# Patient Record
Sex: MALE | Race: WHITE | Employment: FULL TIME | URBAN - METROPOLITAN AREA
[De-identification: names, ages, dates, MRNs, and addresses within clinical notes are randomized per-mention and may not be internally consistent; named-entity substitution may affect disease eponyms.]

---

## 2024-01-10 ENCOUNTER — OFFICE VISIT (OUTPATIENT)
Age: 59
End: 2024-01-10
Payer: COMMERCIAL

## 2024-01-10 VITALS
BODY MASS INDEX: 31.28 KG/M2 | SYSTOLIC BLOOD PRESSURE: 132 MMHG | WEIGHT: 206.4 LBS | TEMPERATURE: 97.9 F | HEART RATE: 60 BPM | DIASTOLIC BLOOD PRESSURE: 80 MMHG | OXYGEN SATURATION: 98 % | HEIGHT: 68 IN

## 2024-01-10 DIAGNOSIS — N20.0 RENAL STONE: ICD-10-CM

## 2024-01-10 DIAGNOSIS — E78.2 MIXED HYPERLIPIDEMIA: ICD-10-CM

## 2024-01-10 DIAGNOSIS — I10 PRIMARY HYPERTENSION: Primary | ICD-10-CM

## 2024-01-10 PROCEDURE — 3017F COLORECTAL CA SCREEN DOC REV: CPT | Performed by: FAMILY MEDICINE

## 2024-01-10 PROCEDURE — 1036F TOBACCO NON-USER: CPT | Performed by: FAMILY MEDICINE

## 2024-01-10 PROCEDURE — 3075F SYST BP GE 130 - 139MM HG: CPT | Performed by: FAMILY MEDICINE

## 2024-01-10 PROCEDURE — G8427 DOCREV CUR MEDS BY ELIG CLIN: HCPCS | Performed by: FAMILY MEDICINE

## 2024-01-10 PROCEDURE — 99213 OFFICE O/P EST LOW 20 MIN: CPT | Performed by: FAMILY MEDICINE

## 2024-01-10 PROCEDURE — G8482 FLU IMMUNIZE ORDER/ADMIN: HCPCS | Performed by: FAMILY MEDICINE

## 2024-01-10 PROCEDURE — G8417 CALC BMI ABV UP PARAM F/U: HCPCS | Performed by: FAMILY MEDICINE

## 2024-01-10 PROCEDURE — 3079F DIAST BP 80-89 MM HG: CPT | Performed by: FAMILY MEDICINE

## 2024-01-10 RX ORDER — LISINOPRIL AND HYDROCHLOROTHIAZIDE 12.5; 1 MG/1; MG/1
1 TABLET ORAL DAILY
COMMUNITY

## 2024-01-10 SDOH — ECONOMIC STABILITY: INCOME INSECURITY: HOW HARD IS IT FOR YOU TO PAY FOR THE VERY BASICS LIKE FOOD, HOUSING, MEDICAL CARE, AND HEATING?: NOT HARD AT ALL

## 2024-01-10 SDOH — ECONOMIC STABILITY: FOOD INSECURITY: WITHIN THE PAST 12 MONTHS, THE FOOD YOU BOUGHT JUST DIDN'T LAST AND YOU DIDN'T HAVE MONEY TO GET MORE.: NEVER TRUE

## 2024-01-10 SDOH — ECONOMIC STABILITY: FOOD INSECURITY: WITHIN THE PAST 12 MONTHS, YOU WORRIED THAT YOUR FOOD WOULD RUN OUT BEFORE YOU GOT MONEY TO BUY MORE.: NEVER TRUE

## 2024-01-10 SDOH — ECONOMIC STABILITY: HOUSING INSECURITY
IN THE LAST 12 MONTHS, WAS THERE A TIME WHEN YOU DID NOT HAVE A STEADY PLACE TO SLEEP OR SLEPT IN A SHELTER (INCLUDING NOW)?: NO

## 2024-01-10 ASSESSMENT — PATIENT HEALTH QUESTIONNAIRE - PHQ9
1. LITTLE INTEREST OR PLEASURE IN DOING THINGS: 0
SUM OF ALL RESPONSES TO PHQ QUESTIONS 1-9: 0
2. FEELING DOWN, DEPRESSED OR HOPELESS: 0
SUM OF ALL RESPONSES TO PHQ9 QUESTIONS 1 & 2: 0
SUM OF ALL RESPONSES TO PHQ QUESTIONS 1-9: 0

## 2024-01-10 NOTE — PROGRESS NOTES
[facility]     Date of Visit:  1/10/2024  Patient Name: Bernabe Felix   Patient :  1965     CHIEF COMPLAINT/HPI:     Bernabe Felix is a 58 y.o. male who presents today for an general visit to be evaluated for the following condition(s):  Chief Complaint   Patient presents with    Check-Up     Patient is here for a 6 month check up.      No new healthcare issues patient is doing very well.  He maintains his blood pressure on medications.  Labs were reviewed.    Lab Results   Component Value Date/Time     2023 08:45 AM    K 4.4 2023 08:45 AM     2023 08:45 AM    CO2 24 2023 08:45 AM    BUN 15 2023 08:45 AM    CREATININE 0.9 2023 08:45 AM    CALCIUM 9.4 2023 08:45 AM    LABGLOM >60 2023 08:45 AM        No results found for: \"MALBCR\"     Lab Results   Component Value Date    HDL 48 2023    LDLCHOLESTEROL 129 (H) 2023    VLDL 22 2023    CHOLHDLRATIO 4.0 2023        No results found for: \"WBC\", \"HGB\", \"HCT\", \"MCV\", \"PLT\"    Lab Results   Component Value Date    ALT 58 (H) 2023    AST 42 2023    ALKPHOS 65 2023    BILITOT 1.1 2023        No results found for: \"TSH\", \"TSHREFLEX\", \"TSHFT4\", \"TSHELE\", \"YVL6ZXJ\", \"TSHHS\"     REVIEW OF SYSTEM      Review of Systems  Patient has no other healthcare issues.  No fever no sweats no chills. No chest pain nor shortness of breath. No nausea nor vomiting no diarrhea . No  complaints.  No edema issues.      REVIEWED INFORMATION      Allergies   Allergen Reactions    Lincocin [Lincomycin Hcl]     Penicillins     Sulfa Antibiotics        Current Outpatient Medications   Medication Sig Dispense Refill    Multiple Vitamin (MULTIVITAMIN ADULT PO) Take 1 capsule by mouth Daily      Flaxseed, Linseed, (FLAX SEED OIL PO) Take 1 capsule by mouth daily      lisinopril-hydroCHLOROthiazide (PRINZIDE;ZESTORETIC) 10-12.5 MG per tablet Take 1 tablet by mouth daily

## 2024-03-01 RX ORDER — LISINOPRIL AND HYDROCHLOROTHIAZIDE 12.5; 1 MG/1; MG/1
1 TABLET ORAL DAILY
Qty: 30 TABLET | Refills: 6 | Status: SHIPPED | OUTPATIENT
Start: 2024-03-01

## 2024-03-01 NOTE — TELEPHONE ENCOUNTER
Bernabe Felix is calling to request a refill on the following medication(s):    Medication Request:  Requested Prescriptions     Pending Prescriptions Disp Refills    lisinopril-hydroCHLOROthiazide (PRINZIDE;ZESTORETIC) 10-12.5 MG per tablet [Pharmacy Med Name: LISINOPRIL-HCTZ 10-12.5 MG TAB] 30 tablet      Sig: take 1 tablet by mouth once daily       Last Visit Date (If Applicable):  1/10/2024    Next Visit Date:    7/12/2024

## 2024-07-12 ENCOUNTER — OFFICE VISIT (OUTPATIENT)
Age: 59
End: 2024-07-12
Payer: COMMERCIAL

## 2024-07-12 VITALS
DIASTOLIC BLOOD PRESSURE: 88 MMHG | BODY MASS INDEX: 30.92 KG/M2 | SYSTOLIC BLOOD PRESSURE: 130 MMHG | WEIGHT: 204 LBS | OXYGEN SATURATION: 96 % | HEART RATE: 73 BPM | HEIGHT: 68 IN | RESPIRATION RATE: 14 BRPM

## 2024-07-12 DIAGNOSIS — Z12.5 PROSTATE CANCER SCREENING: ICD-10-CM

## 2024-07-12 DIAGNOSIS — E78.2 MIXED HYPERLIPIDEMIA: Primary | ICD-10-CM

## 2024-07-12 DIAGNOSIS — N20.0 RENAL STONE: ICD-10-CM

## 2024-07-12 DIAGNOSIS — I10 PRIMARY HYPERTENSION: ICD-10-CM

## 2024-07-12 PROCEDURE — G8427 DOCREV CUR MEDS BY ELIG CLIN: HCPCS | Performed by: FAMILY MEDICINE

## 2024-07-12 PROCEDURE — 1036F TOBACCO NON-USER: CPT | Performed by: FAMILY MEDICINE

## 2024-07-12 PROCEDURE — 99213 OFFICE O/P EST LOW 20 MIN: CPT | Performed by: FAMILY MEDICINE

## 2024-07-12 PROCEDURE — G8417 CALC BMI ABV UP PARAM F/U: HCPCS | Performed by: FAMILY MEDICINE

## 2024-07-12 PROCEDURE — 3075F SYST BP GE 130 - 139MM HG: CPT | Performed by: FAMILY MEDICINE

## 2024-07-12 PROCEDURE — 3079F DIAST BP 80-89 MM HG: CPT | Performed by: FAMILY MEDICINE

## 2024-07-12 PROCEDURE — 3017F COLORECTAL CA SCREEN DOC REV: CPT | Performed by: FAMILY MEDICINE

## 2024-07-12 NOTE — PROGRESS NOTES
[facility]     Date of Visit:  2024  Patient Name: Bernabe Felix   Patient :  1965     CHIEF COMPLAINT/HPI:     Bernabe Felix is a 58 y.o. male who presents today for an general visit to be evaluated for the following condition(s):  Chief Complaint   Patient presents with    Discuss Medications    Discuss Labs    Hypertension   Patient has been doing well in general no new healthcare issues he does not need any refills.  He is up-to-date on PSAs and colonoscopies.  He does see an eye physician    Lab Results   Component Value Date/Time     2023 08:45 AM    K 4.4 2023 08:45 AM     2023 08:45 AM    CO2 24 2023 08:45 AM    BUN 15 2023 08:45 AM    CREATININE 0.9 2023 08:45 AM    CALCIUM 9.4 2023 08:45 AM    LABGLOM >60 2023 08:45 AM          Lab Results   Component Value Date    HDL 48 2023    VLDL 22 2023    CHOLHDLRATIO 4.0 2023        Lab Results   Component Value Date    ALT 58 (H) 2023    AST 42 2023    ALKPHOS 65 2023    BILITOT 1.1 2023        REVIEW OF SYSTEM      Review of Systems  Patient has no other healthcare issues.  No fever no sweats no chills. No chest pain nor shortness of breath. No nausea nor vomiting no diarrhea . No  complaints.  No edema issues.      REVIEWED INFORMATION      Allergies   Allergen Reactions    Lincocin [Lincomycin Hcl]     Penicillins     Sulfa Antibiotics        Current Outpatient Medications   Medication Sig Dispense Refill    lisinopril-hydroCHLOROthiazide (PRINZIDE;ZESTORETIC) 10-12.5 MG per tablet take 1 tablet by mouth once daily 30 tablet 6    Multiple Vitamin (MULTIVITAMIN ADULT PO) Take 1 capsule by mouth Daily      Flaxseed, Linseed, (FLAX SEED OIL PO) Take 1 capsule by mouth daily      Omega-3 Fatty Acids (FISH OIL PO) Take 1 capsule by mouth daily       No current facility-administered medications for this visit.        Patient Active

## 2024-07-12 NOTE — PATIENT INSTRUCTIONS
Bernabe    Thank you for choosing [facility].  We know you have options when it comes to your healthcare; we appreciate that you chose us. Our goal is to provide exceptional  service and world class care to every patient.  You will be receiving a survey via email or text message asking for your feedback.  Please take a few minutes to share your thoughts about your recent visit. Your comments help us understand what we do well and ways we can improve.  Thank you in advance for your valuable feedback.      Dr. linda Gomez MA

## 2024-07-29 ENCOUNTER — OFFICE VISIT (OUTPATIENT)
Age: 59
End: 2024-07-29
Payer: COMMERCIAL

## 2024-07-29 ENCOUNTER — TELEPHONE (OUTPATIENT)
Age: 59
End: 2024-07-29

## 2024-07-29 ENCOUNTER — HOSPITAL ENCOUNTER (OUTPATIENT)
Age: 59
Setting detail: SPECIMEN
Discharge: HOME | End: 2024-07-29

## 2024-07-29 VITALS
DIASTOLIC BLOOD PRESSURE: 84 MMHG | HEIGHT: 68 IN | HEART RATE: 88 BPM | BODY MASS INDEX: 31.07 KG/M2 | SYSTOLIC BLOOD PRESSURE: 136 MMHG | OXYGEN SATURATION: 97 % | WEIGHT: 205 LBS

## 2024-07-29 DIAGNOSIS — M10.071 ACUTE IDIOPATHIC GOUT INVOLVING TOE OF RIGHT FOOT: Primary | ICD-10-CM

## 2024-07-29 LAB — URATE SERPL-MCNC: 6.5 MG/DL (ref 3.4–7)

## 2024-07-29 PROCEDURE — 1036F TOBACCO NON-USER: CPT | Performed by: FAMILY MEDICINE

## 2024-07-29 PROCEDURE — 3079F DIAST BP 80-89 MM HG: CPT | Performed by: FAMILY MEDICINE

## 2024-07-29 PROCEDURE — 99213 OFFICE O/P EST LOW 20 MIN: CPT | Performed by: FAMILY MEDICINE

## 2024-07-29 PROCEDURE — 3075F SYST BP GE 130 - 139MM HG: CPT | Performed by: FAMILY MEDICINE

## 2024-07-29 PROCEDURE — G8427 DOCREV CUR MEDS BY ELIG CLIN: HCPCS | Performed by: FAMILY MEDICINE

## 2024-07-29 PROCEDURE — 3017F COLORECTAL CA SCREEN DOC REV: CPT | Performed by: FAMILY MEDICINE

## 2024-07-29 PROCEDURE — G8417 CALC BMI ABV UP PARAM F/U: HCPCS | Performed by: FAMILY MEDICINE

## 2024-07-29 RX ORDER — METHYLPREDNISOLONE 4 MG/1
TABLET ORAL
Qty: 1 KIT | Refills: 0 | Status: SHIPPED | OUTPATIENT
Start: 2024-07-29

## 2024-07-29 NOTE — PROGRESS NOTES
[facility]     Date of Visit:  2024  Patient Name: Bernabe Felix   Patient :  1965     CHIEF COMPLAINT/HPI:     Bernabe Felix is a 58 y.o. male who presents today for an general visit to be evaluated for the following condition(s):  Chief Complaint   Patient presents with    Toe Pain     Big toe swollen and stiff.     Possible gout?    Saturday patient noticed that his right great toe started to ache and gradually became worse.  No history of trauma no unusual standing or walking activities.  He took some Aleve yesterday and it is much better today he has no history of gout    REVIEW OF SYSTEM      Review of Systems  Patient has no other healthcare issues.  No fever no sweats no chills. No chest pain nor shortness of breath. No nausea nor vomiting no diarrhea . No  complaints.  No edema issues.      REVIEWED INFORMATION      Allergies   Allergen Reactions    Lincocin [Lincomycin Hcl]     Penicillins     Sulfa Antibiotics        Current Outpatient Medications   Medication Sig Dispense Refill    methylPREDNISolone (MEDROL DOSEPACK) 4 MG tablet Take by mouth as directed on pack 1 kit 0    lisinopril-hydroCHLOROthiazide (PRINZIDE;ZESTORETIC) 10-12.5 MG per tablet take 1 tablet by mouth once daily 30 tablet 6    Multiple Vitamin (MULTIVITAMIN ADULT PO) Take 1 capsule by mouth Daily      Flaxseed, Linseed, (FLAX SEED OIL PO) Take 1 capsule by mouth daily      Omega-3 Fatty Acids (FISH OIL PO) Take 1 capsule by mouth daily       No current facility-administered medications for this visit.        Patient Active Problem List   Diagnosis    Primary hypertension    Mixed hyperlipidemia    Renal stone       Past Medical History:   Diagnosis Date    Actinic keratosis     Balanitis     Dermatitis     Erectile dysfunction     Gastroesophageal reflux disease     Hyperlipidemia     Impaired fasting glucose     Mixed dyslipidemia     Primary hypertension     Prostate cancer (HCC)     Routine general

## 2024-10-29 RX ORDER — LISINOPRIL AND HYDROCHLOROTHIAZIDE 10; 12.5 MG/1; MG/1
1 TABLET ORAL DAILY
Qty: 30 TABLET | Refills: 6 | Status: SHIPPED | OUTPATIENT
Start: 2024-10-29

## 2024-10-29 NOTE — TELEPHONE ENCOUNTER
Bernabe Felix is calling to request a refill on the following medication(s):    Medication Request:  Requested Prescriptions     Pending Prescriptions Disp Refills    lisinopril-hydroCHLOROthiazide (PRINZIDE;ZESTORETIC) 10-12.5 MG per tablet 30 tablet 6     Sig: Take 1 tablet by mouth daily       Last Visit Date (If Applicable):  7/29/2024    Next Visit Date:    1/17/2025

## 2024-12-23 DIAGNOSIS — E78.2 MIXED HYPERLIPIDEMIA: ICD-10-CM

## 2024-12-23 DIAGNOSIS — M10.071 ACUTE IDIOPATHIC GOUT INVOLVING TOE OF RIGHT FOOT: ICD-10-CM

## 2024-12-23 DIAGNOSIS — N20.0 RENAL STONE: ICD-10-CM

## 2024-12-23 DIAGNOSIS — I10 PRIMARY HYPERTENSION: ICD-10-CM

## 2024-12-23 DIAGNOSIS — Z12.5 PROSTATE CANCER SCREENING: ICD-10-CM

## 2024-12-23 LAB
BASOPHILS ABSOLUTE: ABNORMAL
BASOPHILS RELATIVE PERCENT: ABNORMAL
EOSINOPHILS ABSOLUTE: ABNORMAL
EOSINOPHILS RELATIVE PERCENT: ABNORMAL
ESTIMATED AVERAGE GLUCOSE: NORMAL
HBA1C MFR BLD: 6.6 %
HCT VFR BLD CALC: 53.8 % (ref 41–53)
HEMOGLOBIN: 17.2 G/DL (ref 13.5–17.5)
LYMPHOCYTES ABSOLUTE: ABNORMAL
LYMPHOCYTES RELATIVE PERCENT: ABNORMAL
MCH RBC QN AUTO: 29.1 PG
MCHC RBC AUTO-ENTMCNC: 32 G/DL
MCV RBC AUTO: 90.8 FL
MONOCYTES ABSOLUTE: ABNORMAL
MONOCYTES RELATIVE PERCENT: ABNORMAL
NEUTROPHILS ABSOLUTE: ABNORMAL
NEUTROPHILS RELATIVE PERCENT: ABNORMAL
PLATELET # BLD: 250 K/ΜL
PMV BLD AUTO: ABNORMAL FL
PROSTATE SPECIFIC ANTIGEN: 1.37 NG/ML
RBC # BLD: 5.93 10^6/ΜL
WBC # BLD: 7.4 10^3/ML

## 2025-01-17 ENCOUNTER — OFFICE VISIT (OUTPATIENT)
Age: 60
End: 2025-01-17
Payer: COMMERCIAL

## 2025-01-17 VITALS
HEART RATE: 64 BPM | OXYGEN SATURATION: 99 % | BODY MASS INDEX: 31.83 KG/M2 | DIASTOLIC BLOOD PRESSURE: 82 MMHG | WEIGHT: 210 LBS | HEIGHT: 68 IN | SYSTOLIC BLOOD PRESSURE: 132 MMHG

## 2025-01-17 DIAGNOSIS — E78.2 MIXED HYPERLIPIDEMIA: ICD-10-CM

## 2025-01-17 DIAGNOSIS — I10 PRIMARY HYPERTENSION: Primary | ICD-10-CM

## 2025-01-17 PROCEDURE — G8427 DOCREV CUR MEDS BY ELIG CLIN: HCPCS | Performed by: FAMILY MEDICINE

## 2025-01-17 PROCEDURE — 1036F TOBACCO NON-USER: CPT | Performed by: FAMILY MEDICINE

## 2025-01-17 PROCEDURE — 3079F DIAST BP 80-89 MM HG: CPT | Performed by: FAMILY MEDICINE

## 2025-01-17 PROCEDURE — G8417 CALC BMI ABV UP PARAM F/U: HCPCS | Performed by: FAMILY MEDICINE

## 2025-01-17 PROCEDURE — 3075F SYST BP GE 130 - 139MM HG: CPT | Performed by: FAMILY MEDICINE

## 2025-01-17 PROCEDURE — 99213 OFFICE O/P EST LOW 20 MIN: CPT | Performed by: FAMILY MEDICINE

## 2025-01-17 PROCEDURE — 3017F COLORECTAL CA SCREEN DOC REV: CPT | Performed by: FAMILY MEDICINE

## 2025-01-17 SDOH — ECONOMIC STABILITY: FOOD INSECURITY: WITHIN THE PAST 12 MONTHS, THE FOOD YOU BOUGHT JUST DIDN'T LAST AND YOU DIDN'T HAVE MONEY TO GET MORE.: NEVER TRUE

## 2025-01-17 SDOH — ECONOMIC STABILITY: FOOD INSECURITY: WITHIN THE PAST 12 MONTHS, YOU WORRIED THAT YOUR FOOD WOULD RUN OUT BEFORE YOU GOT MONEY TO BUY MORE.: NEVER TRUE

## 2025-01-17 ASSESSMENT — PATIENT HEALTH QUESTIONNAIRE - PHQ9
SUM OF ALL RESPONSES TO PHQ QUESTIONS 1-9: 0
1. LITTLE INTEREST OR PLEASURE IN DOING THINGS: NOT AT ALL
SUM OF ALL RESPONSES TO PHQ QUESTIONS 1-9: 0
SUM OF ALL RESPONSES TO PHQ9 QUESTIONS 1 & 2: 0
SUM OF ALL RESPONSES TO PHQ QUESTIONS 1-9: 0
SUM OF ALL RESPONSES TO PHQ QUESTIONS 1-9: 0
2. FEELING DOWN, DEPRESSED OR HOPELESS: NOT AT ALL

## 2025-01-17 NOTE — PROGRESS NOTES
[facility]     Date of Visit:  2025  Patient Name: Bernabe Felix   Patient :  1965     CHIEF COMPLAINT/HPI:     Bernabe Felix is a 59 y.o. male who presents today for an general visit to be evaluated for the following condition(s):  Chief Complaint   Patient presents with    Blood Work    Hypertension     Recent labs were reviewed uric acid was normal.  Hemoglobin A1c 6.6.  PSA was 1.37.  White count 7.4 hemoglobin 7.2 hematocrit 53.8 platelet count was normal.  CMP profile showed a sugar of 116 ALT 61 and rest of blood proteins renal functions and electrolytes were normal.  REVIEW OF SYSTEM      Review of Systems  Patient has no other healthcare issues.  No fever no sweats no chills. No chest pain nor shortness of breath. No nausea nor vomiting no diarrhea . No  complaints.  No edema issues.      REVIEWED INFORMATION      Allergies   Allergen Reactions    Lincocin [Lincomycin Hcl]     Penicillins     Sulfa Antibiotics        Current Outpatient Medications   Medication Sig Dispense Refill    lisinopril-hydroCHLOROthiazide (PRINZIDE;ZESTORETIC) 10-12.5 MG per tablet Take 1 tablet by mouth daily 30 tablet 6    Multiple Vitamin (MULTIVITAMIN ADULT PO) Take 1 capsule by mouth Daily      Flaxseed, Linseed, (FLAX SEED OIL PO) Take 1 capsule by mouth daily       No current facility-administered medications for this visit.        Patient Active Problem List   Diagnosis    Primary hypertension    Mixed hyperlipidemia    Renal stone       Past Medical History:   Diagnosis Date    Actinic keratosis     Balanitis     Dermatitis     Erectile dysfunction     Gastroesophageal reflux disease     Hyperlipidemia     Impaired fasting glucose     Mixed dyslipidemia     Primary hypertension     Prostate cancer (HCC)     Routine general medical examination at a health care facility     Scoliosis        Past Surgical History:   Procedure Laterality Date    COLONOSCOPY W/ BIOPSIES      repeat in 5 years

## 2025-01-17 NOTE — PATIENT INSTRUCTIONS
Bernabe    Thank you for choosing [facility].  We know you have options when it comes to your healthcare; we appreciate that you chose us. Our goal is to provide exceptional  service and world class care to every patient.  You will be receiving a survey via email or text message asking for your feedback.  Please take a few minutes to share your thoughts about your recent visit. Your comments help us understand what we do well and ways we can improve.  Thank you in advance for your valuable feedback.      Dr. Marika MCCRACKEN MA

## 2025-05-29 ENCOUNTER — TELEPHONE (OUTPATIENT)
Age: 60
End: 2025-05-29

## 2025-05-29 RX ORDER — LISINOPRIL AND HYDROCHLOROTHIAZIDE 10; 12.5 MG/1; MG/1
1 TABLET ORAL DAILY
Qty: 30 TABLET | Refills: 6 | Status: SHIPPED | OUTPATIENT
Start: 2025-05-29

## 2025-05-29 NOTE — TELEPHONE ENCOUNTER
Bernabe Felix is calling to request a refill on the following medication(s):    Medication Request:  Requested Prescriptions     Pending Prescriptions Disp Refills    lisinopril-hydroCHLOROthiazide (PRINZIDE;ZESTORETIC) 10-12.5 MG per tablet 30 tablet 6     Sig: Take 1 tablet by mouth daily       Last Visit Date (If Applicable):  1/17/2025    Next Visit Date:    7/18/2025

## 2025-05-29 NOTE — TELEPHONE ENCOUNTER
Patient requesting a prescription for Lisinopril hctz 10-12.5 mg 1 tab by mouth daily to be sent to [facility]

## 2025-07-11 DIAGNOSIS — E78.2 MIXED HYPERLIPIDEMIA: ICD-10-CM

## 2025-07-11 DIAGNOSIS — I10 PRIMARY HYPERTENSION: ICD-10-CM

## 2025-07-11 LAB
CHOLESTEROL, TOTAL: 200 MG/DL
CHOLESTEROL/HDL RATIO: 3.8
HDLC SERPL-MCNC: 52 MG/DL (ref 35–70)
LDL CHOLESTEROL: 128
NONHDLC SERPL-MCNC: NORMAL MG/DL
TRIGL SERPL-MCNC: 102 MG/DL
VLDLC SERPL CALC-MCNC: 20 MG/DL

## 2025-07-17 NOTE — PATIENT INSTRUCTIONS
Bernabe    Thank you for choosing [facility].  We know you have options when it comes to your healthcare; we appreciate that you chose us. Our goal is to provide exceptional service and world class care to every patient.  You will be receiving a survey via email or text message asking for your feedback.  Please take a few minutes to share your thoughts about your recent visit. Your comments helps us understand what we do well and ways we can improve.    We thank you in advance for your valuable feedback      Dr. Del Hairston and BILLY Quarles

## 2025-07-18 ENCOUNTER — OFFICE VISIT (OUTPATIENT)
Age: 60
End: 2025-07-18
Payer: COMMERCIAL

## 2025-07-18 VITALS
OXYGEN SATURATION: 98 % | TEMPERATURE: 98.2 F | WEIGHT: 206 LBS | DIASTOLIC BLOOD PRESSURE: 80 MMHG | HEART RATE: 66 BPM | HEIGHT: 68 IN | BODY MASS INDEX: 31.22 KG/M2 | SYSTOLIC BLOOD PRESSURE: 130 MMHG

## 2025-07-18 DIAGNOSIS — I10 PRIMARY HYPERTENSION: Primary | ICD-10-CM

## 2025-07-18 DIAGNOSIS — R73.9 HYPERGLYCEMIA: ICD-10-CM

## 2025-07-18 DIAGNOSIS — E78.2 MIXED HYPERLIPIDEMIA: ICD-10-CM

## 2025-07-18 DIAGNOSIS — Z12.5 PROSTATE CANCER SCREENING: ICD-10-CM

## 2025-07-18 PROCEDURE — 3017F COLORECTAL CA SCREEN DOC REV: CPT | Performed by: FAMILY MEDICINE

## 2025-07-18 PROCEDURE — 3075F SYST BP GE 130 - 139MM HG: CPT | Performed by: FAMILY MEDICINE

## 2025-07-18 PROCEDURE — 99213 OFFICE O/P EST LOW 20 MIN: CPT | Performed by: FAMILY MEDICINE

## 2025-07-18 PROCEDURE — G8427 DOCREV CUR MEDS BY ELIG CLIN: HCPCS | Performed by: FAMILY MEDICINE

## 2025-07-18 PROCEDURE — 1036F TOBACCO NON-USER: CPT | Performed by: FAMILY MEDICINE

## 2025-07-18 PROCEDURE — 3079F DIAST BP 80-89 MM HG: CPT | Performed by: FAMILY MEDICINE

## 2025-07-18 PROCEDURE — G8417 CALC BMI ABV UP PARAM F/U: HCPCS | Performed by: FAMILY MEDICINE

## 2025-07-18 SDOH — ECONOMIC STABILITY: FOOD INSECURITY: WITHIN THE PAST 12 MONTHS, THE FOOD YOU BOUGHT JUST DIDN'T LAST AND YOU DIDN'T HAVE MONEY TO GET MORE.: NEVER TRUE

## 2025-07-18 SDOH — ECONOMIC STABILITY: FOOD INSECURITY: WITHIN THE PAST 12 MONTHS, YOU WORRIED THAT YOUR FOOD WOULD RUN OUT BEFORE YOU GOT MONEY TO BUY MORE.: NEVER TRUE

## 2025-07-18 ASSESSMENT — PATIENT HEALTH QUESTIONNAIRE - PHQ9
2. FEELING DOWN, DEPRESSED OR HOPELESS: NOT AT ALL
SUM OF ALL RESPONSES TO PHQ QUESTIONS 1-9: 0
1. LITTLE INTEREST OR PLEASURE IN DOING THINGS: NOT AT ALL
SUM OF ALL RESPONSES TO PHQ QUESTIONS 1-9: 0

## 2025-07-18 NOTE — PROGRESS NOTES
[facility]     Date of Visit:  2025  Patient Name: Bernabe Felix   Patient :  1965     CHIEF COMPLAINT/HPI:     Bernabe Felix is a 59 y.o. male who presents today for an general visit to be evaluated for the following condition(s):  Chief Complaint   Patient presents with    6 Month Follow-Up     Labs were reviewed sugar 117 which is stable.  Diet was discussed patient is more active during the summer months.  Cholesterol is stable        Hemoglobin A1C   Date Value Ref Range Status   2024 6.6 % Final           Lab Results   Component Value Date    CHOL 200 2025    TRIG 102 2025    HDL 52 2025    VLDL 20 2025    CHOLHDLRATIO 3.8 2025        [employer]  Outside Information      suggestion  Information displayed in this report may not trend or trigger automated decision support.      Contains abnormal data Comprehensive metabolic panel  Order: [id number]  Component  Ref Range & Units 25 0840   SODIUM  134 - 146 mmol/L 138   POTASSIUM  3.5 - 5.0 mmol/L 3.9   CHLORIDE  98 - 109 mmol/L 102   CARBON DIOXIDE  22 - 32 mmol/L 27   ANION GAP  5 - 15 mmol/L 9   BLOOD UREA NITROGEN  5 - 23 mg/dL 16   CREATININE  0.60 - 1.30 mg/dL 0.83   Comment: METHOD TRACEABLE TO IDMS STANDARD   GLUCOSE  65 - 99 mg/dL 117 High    CALCIUM  8.5 - 10.5 mg/dL 9.3   TOTAL PROTEIN  6.0 - 8.0 g/dL 6.6   ALBUMIN  3.2 - 5.3 g/dL 4.4   ALKALINE PHOSPHATASE  39 - 130 U/L 55   AST  <=41 U/L 23   ALT  <=40 U/L 31   BILIRUBIN,TOTAL  0.3 - 1.2 mg/dL 1.5 High    EGFR Non-Race Dependent  >=            REVIEW OF SYSTEM      Review of Systems  Patient has no other healthcare issues.  No fever no sweats no chills. No chest pain nor shortness of breath. No nausea nor vomiting no diarrhea . No  complaints.  No edema issues.      REVIEWED INFORMATION      Allergies   Allergen Reactions    Lincocin [Lincomycin Hcl]     Penicillins     Sulfa Antibiotics        Current Outpatient